# Patient Record
(demographics unavailable — no encounter records)

---

## 2025-01-29 NOTE — PHYSICAL EXAM
[Chaperone Present] : A chaperone was present in the examining room during all aspects of the physical examination [[id number]] : A chaperone was present during the pelvic exam. [Appropriately responsive] : appropriately responsive [Alert] : alert [No Acute Distress] : no acute distress [Soft] : soft [Non-tender] : non-tender [Non-distended] : non-distended [No HSM] : No HSM [No Lesions] : no lesions [No Mass] : no mass [Oriented x3] : oriented x3 [Examination Of The Breasts] : a normal appearance [No Masses] : no breast masses were palpable [Vulvar Atrophy] : vulvar atrophy [Labia Majora] : normal [Labia Minora] : normal [Atrophy] : atrophy [Normal] : normal [Uterine Adnexae] : normal [FreeTextEntry2] : Yuri Ferrara [FreeTextEntry1] : skin tag on right buttock, superficial, appearance possibly c/w condyloma

## 2025-01-29 NOTE — PROCEDURE
[Vulvar Biopsy] : Vulvar Biopsy [Time out performed] : Pre-procedure time out performed.  Patient's name, date of birth and procedure confirmed. [Consent Obtained] : Consent obtained [____ Lidocaine w/o Epi] : ~VmL lidocaine without epinephrine [Sent to Pathology] : placed in buffered formalin and sent for pathology [Tolerated Well] : the patient tolerated the procedure well [No Complications] : there were no complications [de-identified] : right buttock [de-identified] : Scissor

## 2025-01-29 NOTE — HISTORY OF PRESENT ILLNESS
[FreeTextEntry1] : 2025. ADAM CLAY 69 year old female  LMP , presents for f/u appt for urinary incontinence, atrophy, and skin tag  She reports vaginal dryness, managed w/ topical estrogen 3x/wk. She denies VB, abn discharge or vaginitis sxs.   She reports intermittent urinary incontinence, improved w/ time and w/ yoga exercise, has been unable to find pelvic floor PT site near her that is covered by insurance. She has normal BM, no bloody stool. She denies abdominal or pelvic pain.  She is sometimes sexually active, w/ stable partner ()  She sees Dr. Yo for h/o osteopenia.   OBHx:  - TOPx1 , c/s x2 ,  GynHx: No Hx of STI, fibroids, ovarian cysts, abnl paps, or pelvic infections. PMH: osteopenia, asthma, basal cell carcinoma, squamous cell carcinoma, HTN, HLD, fatty liver SHx: denies FHx: Denies FHx of breast, ovarian, uterine or colon cancer. Med: creon, pantoprazole, rosuvastatin, valsartan, thyroxine, metoprolol, xiidra, ventolin, turmeric, folic acid, vit D3, bASA, multivit All: NKDA Psych: PHQ9 = 0 Social: cigarettes (15yr, quit ), denies alcohol or drug use.

## 2025-01-29 NOTE — PLAN
[FreeTextEntry1] : 69 year old female pt presents for f/u visit:  Skin tag of right buttock, possible condyloma: D/w pt bx r/b/a - recommended due to this being a new finding and appearance c/w condyloma D/w pt how HPV is transmitted, and possibility of delayed onset after remaining dormant for many years Bx done today - pt tolerated well, specimen sent to pathology Recommended applying vaseline as an emollient Pt to call MD if she experiences s/sxs infection  Urinary incontinence: D/w pt pelvic floor PT at [facility] site nearby - Referral given  Vaginal atrophy: C/w topical estrogen Recommended revaree, d/w pt r/b/a  HCM: Breast and pelvic exam performed Rx given for mammogram and breast sonogram Colonoscopy UTD DXA done today - shows osteopenia, f/u w/ Dr. Yo, c/w Vit D 1000U, recommended weight-bearing exercise Advised pt to see PCP annually   RTO in 1 year for annual and pap, or PRN   I, Yuri Ferrara, on 01/29/2025, am scribing for and in the presence of Dr. Carmona in the following sections: HISTORY OF PRESENT ILLNESS, PAST MEDICAL/FAMILY/SOCIAL HISTORY, REVIEW OF SYSTEMS, PHYSICAL EXAM, ASSESSMENT/PLAN